# Patient Record
Sex: FEMALE | Race: WHITE | NOT HISPANIC OR LATINO | Employment: OTHER | URBAN - METROPOLITAN AREA
[De-identification: names, ages, dates, MRNs, and addresses within clinical notes are randomized per-mention and may not be internally consistent; named-entity substitution may affect disease eponyms.]

---

## 2023-09-07 ENCOUNTER — PATIENT MESSAGE (OUTPATIENT)
Dept: RESEARCH | Facility: HOSPITAL | Age: 68
End: 2023-09-07
Payer: OTHER GOVERNMENT

## 2025-05-27 DIAGNOSIS — M54.16 LUMBAR RADICULOPATHY: Primary | ICD-10-CM

## 2025-06-30 ENCOUNTER — OFFICE VISIT (OUTPATIENT)
Facility: CLINIC | Age: 70
End: 2025-06-30
Payer: OTHER GOVERNMENT

## 2025-06-30 VITALS
BODY MASS INDEX: 34.49 KG/M2 | SYSTOLIC BLOOD PRESSURE: 111 MMHG | HEIGHT: 64 IN | HEART RATE: 98 BPM | DIASTOLIC BLOOD PRESSURE: 74 MMHG | WEIGHT: 202 LBS

## 2025-06-30 DIAGNOSIS — M54.16 LUMBAR RADICULOPATHY: Primary | ICD-10-CM

## 2025-06-30 DIAGNOSIS — F17.210 CIGARETTE SMOKER MOTIVATED TO QUIT: ICD-10-CM

## 2025-06-30 PROCEDURE — 99204 OFFICE O/P NEW MOD 45 MIN: CPT | Mod: ,,, | Performed by: NURSE PRACTITIONER

## 2025-06-30 NOTE — PROGRESS NOTES
[facility]    Subjective:     Chief Complaint: Back Pain ([facility] C/O pain level 2, Taking Gabapentin for nerve pain, states fell out of tree as a child and broke her tailbone. States falls often ambulating w/a rollator)    Referred by: Elana Lamar He*     History of Present Illness: Jenny Le is a pleasant 69-year-old female  presenting today for a new consult to evaluate and treat pain associated with lumbar radiculopathy.  [place] attributes her pain with the injury when she fell as a child and fractured her tailbone.  It was exacerbated in the [employer] due to all the heavy things she had to carry on her back and the drills caused exacerbation of her back pain.  She has not completed physical therapy for this pain.  She currently takes tramadol, gabapentin 600 mg by mouth twice a day.  Her pain will elevate to a 5/10 on the NRS with walking attempting to cook and chores.  She has to cook in intervals and use a chair to sit.  When she sits or stops the activities her pain score will reduced to a 3/10 on the NRS.  Patient has been a long term cigarette smoker in his engaged to stop.  She is interested in his smoking cessation program.  Review of her lumbar MRI that was faxed and loaded in  has a find size that is illegible.  I am asking Radhamaximiliano bethea to [facility] from [facility].  Patient is agreeable to start physical therapy 1-2 times a week for 6-8 weeks to see if this will help her back pain.  She does not have any spinal surgeries and no metal implants in her back.  Patient also reports she has bunions that hurt her feet as well.  She also reports falling.  She has not fractured with a fall so far.  Her pain score today as a 2/10 on the NRS.  She ambulates with a Rollator.        Pertinent PMH:  Tobacco use, CKD 3, COPD, dehydration, diverticular disease of colon, dry eye  "syndrome, esophageal ulcer, esophagitis, hypertension, prior fall, fatty liver, gastritis, GERD, hyperlipidemia, low back pain, nodules of vocal cords, obesity, back pain, vitamin B12 deficiency, schizoaffective disorder, bipolar type, aneurysm renal artery  Pertinent PSH:  No spinal surgeries, no pacemaker, no spinal cord stimulator or other metal interbody.          Vital signs:   Visit Vitals  /74 (BP Location: Left arm, Patient Position: Sitting)   Pulse 98   Ht 5' 4" (1.626 m)   Wt 91.6 kg (202 lb)   BMI 34.67 kg/m²      Vitals:    06/30/25 1415   PainSc:   2     Pain Disability Index (PDI): 42       Interventional Pain History  None    ROS: Back and bilateral posterior leg pain.    MRI lumbar spine 2025:   (located in  with thought too small)     Findings:  Grade 1 ligament serves as reference for L5 and subsequent numbering of the vertebrae.  Grade 1 anterior listhesis of L4 on L5 measuring 5 mm.  Question right pars defect at L4, 1.8 cm intraosseous hemangioma within L4 marrow signal is otherwise unremarkable.  Vertebral body heights are maintained.  The conus is normal in appearance and terminates at T12-L1 disc space.  Lumbar nerve roots have a normal appearance.  Paraspinal soft tissues are unremarkable.      Multilevel degenerative changes of lumbar spine as follows:   T12-L1:  Preserved disc space.  No central stenosis.  No right neuroforaminal stenosis.  No left neuroforaminal stenosis.    L1-2: Preserved disc space.  Bilateral facet hypertrophy.  No central stenosis.  Mild right neuroforaminal stenosis.  Mild left neural foraminal stenosis.      L2-3: Preserved disc space.  Bilateral facet hypertrophy.  No central stenosis.  Minimal right neuroforaminal stenosis.  Minimal left neuroforaminal stenosis.      L3-L4:  Minimal posterior disc bulge.  Bulky facet hypertrophy.  Ligamentum flavum thickening of 4 mm.      Objective:        Physical Exam  General: Well developed; overweight; " A&O x 3; No anxiety/depression; NAD  Mental Status: Oriented to person, palce and time. Displays appropriate mood & affect.  Head: Norm cephalic and atraumatic  Neck:  No cervical paraspinal banding.  Full range of motion with lateral turning and cervical flexion +extension.  Eyes: normal conjunctiva, normal lids, normal pupils  ENT and mouth: normal external ear, nose, and no lesions noted on the lips.  Respiratory: Symmetrical, Unlabored. No dyspnea  CV: normal rhythm and rate. No peripheral edema.   Abdomen: Non-distended    Extremities:  Gen: No cyanosis or tenderness to palpation bilateral upper and lower extremities  Skin: Warm, pink, dry, no rashes, no lesions on the lumbar spine  Strength: 5/5 motor strength bilateral upper and lower extremities  ROM: Full ROM in bilateral knees and ankles without pain or instability.    Neuro:  Gait: no altalgic lean, normal toe and heel raise. Independent ambulator.  DTR's: 2+ in bilateral patellar,  Sensory: Intact to light touch bilateral  upper and lower extremities    Spine: Normal lordosis. No scoliosis  L-spine ROM: Full ROM to flexion, extension, bilateral rotation,   Straight Leg Raise:  Positive right, positive left  SI Joint: No tenderness to palpation bilaterally.      Assessment:     Jenny Le is a pleasant 69-year-old female  presenting today for a new consult to evaluate and treat pain associated with lumbar radiculopathy.  [place] attributes her pain with the injury when she fell as a child and fractured her tailbone.  It was exacerbated in the [employer] due to all the heavy things she had to carry on her back and the drills caused exacerbation of her back pain.  She has not completed physical therapy for this pain.  She currently takes tramadol, gabapentin 600 mg by mouth twice a day.  Her pain will elevate to a 5/10 on the NRS with walking attempting to cook and chores.  She has to cook in intervals and use a chair to sit.  When she sits or stops  the activities her pain score will reduced to a 3/10 on the NRS.  Patient has been a long term cigarette smoker in his engaged to stop.  She is interested in his smoking cessation program.  Review of her lumbar MRI that was faxed and loaded in  has a find size that is illegible.  I am asking Radha bethea [facility] results from [facility].  Patient is agreeable to start physical therapy 1-2 times a week for 6-8 weeks to see if this will help her back pain.  She does not have any spinal surgeries and no metal implants in her back.  Patient also reports she has bunions that hurt her feet as well.  She also reports falling.  She has not fractured with a fall so far.  Her pain score today as a 2/10 on the NRS.  She ambulates with a Rollator.      Plan of care:   Consult sent for smoking cessation.    Referral sent to [facility] for physical therapy 2-3 times a week for 6-8 weeks.    Rozel to return to clinic in 6 weeks to re-evaluate her lumbar radiculopathy pain.    Encounter Diagnosis   Name Primary?    Lumbar radiculopathy Yes         Plan:       Jenyn was seen today for back pain.    Diagnoses and all orders for this visit:    Lumbar radiculopathy  -     Ambulatory referral/consult to Pain Clinic           Past Medical History:   Diagnosis Date    Bipolar disorder, unspecified     HLD (hyperlipidemia)     Hypertension     Thyroid disease        Past Surgical History:   Procedure Laterality Date    APPENDECTOMY      BLADDER SURGERY      EYE SURGERY Bilateral     HERNIA REPAIR      HYSTERECTOMY      PHACOEMULSIFICATION, CATARACT, WITH IOL INSERTION Right 7/11/2023    Procedure: PHACO WITH  IOL - OD;  Surgeon: Alesia Gooden MD;  Location: [facility] OR;  Service: Ophthalmology;  Laterality: Right;    PHACOEMULSIFICATION, CATARACT, WITH IOL INSERTION Left 8/29/2023    Procedure: PHACO WITH IOL - OS;  Surgeon: Alesia Gooden MD;  Location:  [facility] OR;  Service: Ophthalmology;  Laterality: Left;       No family history on file.    Social History     Socioeconomic History    Marital status:    Tobacco Use    Smoking status: Every Day     Types: Cigarettes, Vaping with nicotine    Smokeless tobacco: Never   Substance and Sexual Activity    Alcohol use: Yes     Comment: seldom    Drug use: Never     Social Drivers of Health     Financial Resource Strain: Unknown (1/31/2019)    [facility]    Overall Financial Resource Strain (CARDIA)     Difficulty of Paying Living Expenses: Patient declined   Food Insecurity: Unknown (1/31/2019)    [facility]    Hunger Vital Sign     Worried About Running Out of Food in the Last Year: Patient declined     Ran Out of Food in the Last Year: Patient declined   Transportation Needs: Unknown (1/31/2019)    [facility]    PRAPARE - Transportation     Lack of Transportation (Medical): Patient declined     Lack of Transportation (Non-Medical): Patient declined   Physical Activity: Unknown (1/31/2019)    [facility]    Exercise Vital Sign     Days of Exercise per Week: Patient declined     Minutes of Exercise per Session: Patient declined   Stress: Unknown (1/31/2019)    [facility]    [employer] [place] [employer] - Occupational Stress Questionnaire     Feeling of Stress : Patient declined       Current Medications[1]    Review of patient's allergies indicates:  No Known Allergies               [1]   Current Outpatient Medications   Medication Sig Dispense Refill    acyclovir  (ZOVIRAX) 200 MG capsule Take 200 mg by mouth.      albuterol-ipratropium (DUO-NEB) 2.5 mg-0.5 mg/3 mL nebulizer solution Inhale 3 mLs into the lungs.      amoxicillin-clavulanate 875-125mg (AUGMENTIN) 875-125 mg per tablet Take 1 tablet by mouth 2 (two) times daily.      aspirin (ECOTRIN) 81 MG EC tablet Take 81 mg by mouth.      atorvastatin (LIPITOR) 80 MG tablet Take 40 mg by mouth.      budesonide-formoterol 160-4.5 mcg (SYMBICORT) 160-4.5 mcg/actuation HFAA Inhale 2 puffs into the lungs.      carBAMazepine (TEGRETOL) 200 mg tablet Take 200 mg by mouth.      cholecalciferol, vitamin D3, (VITAMIN D3) 50 mcg (2,000 unit) Cap capsule Take 2,000 Units by mouth.      clonazePAM (KLONOPIN) 0.5 MG tablet Take 0.5 mg by mouth 3 (three) times daily as needed.      cyanocobalamin (VITAMIN B-12) 1000 MCG tablet Take 1 tablet by mouth every morning.      gabapentin (NEURONTIN) 600 MG tablet Take 600 mg by mouth.      LACTULOSE ORAL Take 15 mLs by mouth 2 (two) times daily as needed.      levothyroxine (SYNTHROID) 125 MCG tablet Take 1 tablet by mouth every morning.      lisinopriL (PRINIVIL,ZESTRIL) 40 MG tablet Take 20 mg by mouth.      loxapine (LOXITANE) 10 MG Cap Take 1 capsule by mouth every evening.      pioglitazone (ACTOS) 15 MG tablet Take 1 tablet by mouth every morning.      polyethylene glycol (GLYCOLAX) 17 gram/dose powder Take by mouth.      prazosin (MINIPRESS) 1 MG Cap Take 1 capsule by mouth every evening.      QUEtiapine (SEROQUEL) 100 MG Tab Take 1 tablet by mouth every evening.      sertraline (ZOLOFT) 100 MG tablet Take 100 mg by mouth.      traMADoL (ULTRAM) 50 mg tablet Take 50 mg by mouth 2 (two) times daily as needed. (Patient not taking: Reported on 6/30/2025)       No current facility-administered medications for this visit.

## 2025-07-28 ENCOUNTER — CLINICAL SUPPORT (OUTPATIENT)
Dept: SMOKING CESSATION | Facility: CLINIC | Age: 70
End: 2025-07-28
Payer: OTHER GOVERNMENT

## 2025-07-28 DIAGNOSIS — F17.200 NICOTINE DEPENDENCE: Primary | ICD-10-CM

## 2025-07-28 PROCEDURE — 99404 PREV MED CNSL INDIV APPRX 60: CPT | Mod: S$GLB,,,

## 2025-07-28 RX ORDER — NICOTINE POLACRILEX 2 MG/1
2 LOZENGE ORAL
Qty: 135 EACH | Refills: 0 | Status: SHIPPED | OUTPATIENT
Start: 2025-07-28

## 2025-07-28 RX ORDER — IBUPROFEN 200 MG
1 TABLET ORAL DAILY
Qty: 28 PATCH | Refills: 3 | Status: SHIPPED | OUTPATIENT
Start: 2025-07-28

## 2025-07-28 NOTE — PROGRESS NOTES
Patient will be participating in tobacco cessation meetings and will begin the prescribed tobacco cessation medication regimen of 21 mg nicotine patch QD and 2 mg nicotine lozenge PRN (1-2 per hour in place of cigarettes). Patient currently smokes 8 cigarettes per day and vapes with 6% nicotine. Pt stated that her vape is refillable and she refills it 6 times per day. Pt stated that she vapes all day long.  Discussed the role of tobacco cessation program, role of nicotine replacement therapy (NRT) and behavioral changes to assist the patient to reach her goal of being tobacco free. Education and instruction on the role of the NRT, usage and proper placement of the patch and lozenge. Pt started on rate reduction and wait time of 15 min prior to smoking. Pt's exhaled carbon monoxide level was 10 ppm as per [employer]. (non- smoker = 0-5 ppm.)

## 2025-08-11 ENCOUNTER — OFFICE VISIT (OUTPATIENT)
Facility: CLINIC | Age: 70
End: 2025-08-11
Payer: OTHER GOVERNMENT

## 2025-08-11 VITALS
DIASTOLIC BLOOD PRESSURE: 77 MMHG | OXYGEN SATURATION: 95 % | SYSTOLIC BLOOD PRESSURE: 126 MMHG | HEIGHT: 64 IN | HEART RATE: 81 BPM | BODY MASS INDEX: 34.48 KG/M2 | WEIGHT: 201.94 LBS

## 2025-08-11 DIAGNOSIS — M54.16 LUMBAR RADICULOPATHY: Primary | ICD-10-CM

## 2025-08-11 DIAGNOSIS — Z87.891 SMOKING HISTORY: ICD-10-CM

## 2025-08-11 PROCEDURE — 99214 OFFICE O/P EST MOD 30 MIN: CPT | Mod: ,,, | Performed by: NURSE PRACTITIONER

## 2025-08-18 ENCOUNTER — TELEPHONE (OUTPATIENT)
Dept: SMOKING CESSATION | Facility: CLINIC | Age: 70
End: 2025-08-18

## 2025-08-25 ENCOUNTER — TELEPHONE (OUTPATIENT)
Dept: SMOKING CESSATION | Facility: CLINIC | Age: 70
End: 2025-08-25
Payer: OTHER GOVERNMENT